# Patient Record
Sex: MALE | Race: WHITE | URBAN - METROPOLITAN AREA
[De-identification: names, ages, dates, MRNs, and addresses within clinical notes are randomized per-mention and may not be internally consistent; named-entity substitution may affect disease eponyms.]

---

## 2024-03-04 ENCOUNTER — OFFICE VISIT (OUTPATIENT)
Dept: ORTHOPEDIC SURGERY | Age: 44
End: 2024-03-04
Payer: COMMERCIAL

## 2024-03-04 VITALS — WEIGHT: 160 LBS | HEIGHT: 71 IN | BODY MASS INDEX: 22.4 KG/M2

## 2024-03-04 DIAGNOSIS — M71.21 BAKER'S CYST OF KNEE, RIGHT: ICD-10-CM

## 2024-03-04 DIAGNOSIS — M25.561 CHRONIC PAIN OF RIGHT KNEE: ICD-10-CM

## 2024-03-04 DIAGNOSIS — M84.453A INSUFFICIENCY FRACTURE OF MEDIAL FEMORAL CONDYLE (HCC): ICD-10-CM

## 2024-03-04 DIAGNOSIS — G89.29 CHRONIC PAIN OF RIGHT KNEE: ICD-10-CM

## 2024-03-04 DIAGNOSIS — M23.203 OLD BUCKET HANDLE TEAR OF MEDIAL MENISCUS OF RIGHT KNEE: Primary | ICD-10-CM

## 2024-03-04 PROCEDURE — 99203 OFFICE O/P NEW LOW 30 MIN: CPT | Performed by: ORTHOPAEDIC SURGERY

## 2024-03-04 RX ORDER — MELOXICAM 7.5 MG/1
7.5 TABLET ORAL DAILY
COMMUNITY
Start: 2024-02-12

## 2024-03-04 RX ORDER — TRAMADOL HYDROCHLORIDE 50 MG/1
50 TABLET ORAL EVERY 8 HOURS PRN
COMMUNITY
Start: 2024-02-12

## 2024-03-04 RX ORDER — LISINOPRIL 20 MG/1
20 TABLET ORAL DAILY
COMMUNITY

## 2024-03-04 RX ORDER — AMLODIPINE BESYLATE 10 MG/1
10 TABLET ORAL DAILY
COMMUNITY
Start: 2024-02-26

## 2024-03-04 RX ORDER — OMEPRAZOLE 20 MG/1
CAPSULE, DELAYED RELEASE ORAL
COMMUNITY
Start: 2024-02-05

## 2024-03-04 NOTE — PROGRESS NOTES
KNEE VISIT      HISTORY OF PRESENT ILLNESS    Dwight Vivar is a 43 y.o. male who presents for evaluation of his right knee.  He said pain in the right knee for greater than a year grades it 5/10.  He has stiffness and pulling type pain that sharp at time on the medial side.  He has some time another physician who recommended a knee replacement.  He was concerned with for not to have it because of his young age.  He has had no other intervention.  He did have an MRI performed and brought that with him.    ROS    Well-documented patient history form dated 3/4/2024  All other ROS negative except for above.    Past Surgical history    Past Surgical History:   Procedure Laterality Date    CHOLECYSTECTOMY      WRIST GANGLION EXCISION  3/2/2012    right       PAST MEDICAL    Past Medical History:   Diagnosis Date    Disc     enlarged- in middle back    Reflux        Allergies    No Known Allergies    Meds    Current Outpatient Medications   Medication Sig Dispense Refill    amLODIPine (NORVASC) 10 MG tablet Take 1 tablet by mouth daily high blood pressure      meloxicam (MOBIC) 7.5 MG tablet Take 1 tablet by mouth daily      omeprazole (PRILOSEC) 20 MG delayed release capsule TAKE ONE CAPSULE BY MOUTH ONCE DAILY AS NEEDED FOR ACID REFLUX; USE IF FAMOTIDINE IS NOT EFFECTIVE      traMADol (ULTRAM) 50 MG tablet Take 1 tablet by mouth every 8 hours as needed for Pain. Max Daily Amount: 150 mg      lisinopril (PRINIVIL;ZESTRIL) 20 MG tablet Take 1 tablet by mouth daily for blood pressure      esomeprazole (NEXIUM) 40 MG capsule Take 40 mg by mouth daily.        TraMADol HCl (ULTRAM PO) Take  by mouth 2 times daily.         No current facility-administered medications for this visit.       Social    Social History     Socioeconomic History    Marital status:      Spouse name: Not on file    Number of children: Not on file    Years of education: Not on file    Highest education level: Not on file   Occupational History

## 2025-07-30 ENCOUNTER — OFFICE VISIT (OUTPATIENT)
Dept: ORTHOPEDIC SURGERY | Age: 45
End: 2025-07-30
Payer: COMMERCIAL

## 2025-07-30 VITALS — WEIGHT: 160 LBS | HEIGHT: 71 IN | BODY MASS INDEX: 22.4 KG/M2

## 2025-07-30 DIAGNOSIS — G89.29 CHRONIC PAIN OF RIGHT KNEE: Primary | ICD-10-CM

## 2025-07-30 DIAGNOSIS — M25.561 CHRONIC PAIN OF RIGHT KNEE: Primary | ICD-10-CM

## 2025-07-30 DIAGNOSIS — M84.453A INSUFFICIENCY FRACTURE OF MEDIAL FEMORAL CONDYLE (HCC): ICD-10-CM

## 2025-07-30 PROCEDURE — 99214 OFFICE O/P EST MOD 30 MIN: CPT | Performed by: ORTHOPAEDIC SURGERY

## 2025-07-30 NOTE — PROGRESS NOTES
KNEE VISIT      HISTORY OF PRESENT ILLNESS    Dwight Vivar is a 45 y.o. male who presents for evaluation of right knee pain has had for some time.  He said he had surgery April of last year which was a medial meniscectomy and chondroplasty with subchondroplasty of the medial femoral condyle I believe.  He said it did not help.  He grades the pain now 7/10 is medial and he has popping and catching top of his knee with a knot behind the knee.  He had Durolane injection in November of this year which helped for a few months.  He denies any interval trauma.  He has difficulty squatting and it is interfering with the quality of his life    ROS    Well-documented patient history form dated 7/30/2025  All other ROS negative except for above.    Past Surgical history    Past Surgical History:   Procedure Laterality Date    CHOLECYSTECTOMY      WRIST GANGLION EXCISION  3/2/2012    right       PAST MEDICAL    Past Medical History:   Diagnosis Date    Disc     enlarged- in middle back    Reflux        Allergies    No Known Allergies    Meds    Current Outpatient Medications   Medication Sig Dispense Refill    amLODIPine (NORVASC) 10 MG tablet Take 1 tablet by mouth daily high blood pressure      lisinopril (PRINIVIL;ZESTRIL) 20 MG tablet Take 1 tablet by mouth daily for blood pressure      meloxicam (MOBIC) 7.5 MG tablet Take 1 tablet by mouth daily      omeprazole (PRILOSEC) 20 MG delayed release capsule TAKE ONE CAPSULE BY MOUTH ONCE DAILY AS NEEDED FOR ACID REFLUX; USE IF FAMOTIDINE IS NOT EFFECTIVE      traMADol (ULTRAM) 50 MG tablet Take 1 tablet by mouth every 8 hours as needed for Pain. Max Daily Amount: 150 mg      esomeprazole (NEXIUM) 40 MG capsule Take 40 mg by mouth daily.        TraMADol HCl (ULTRAM PO) Take  by mouth 2 times daily.         No current facility-administered medications for this visit.       Social    Social History     Socioeconomic History    Marital status:      Spouse name: Not on

## 2025-07-31 ENCOUNTER — TELEPHONE (OUTPATIENT)
Dept: ORTHOPEDIC SURGERY | Age: 45
End: 2025-07-31

## 2025-07-31 RX ORDER — CELECOXIB 200 MG/1
200 CAPSULE ORAL 2 TIMES DAILY
Qty: 180 CAPSULE | Refills: 2 | Status: SHIPPED | OUTPATIENT
Start: 2025-07-31

## 2025-07-31 NOTE — TELEPHONE ENCOUNTER
STATES HE CAME IN FROM A APPOINTMENT YESTERDAY AND THE DOCTOR WAS SUPPOSED TO PRESCRIBE NEW MEDICATION    PLEASE CALL PATIENT -[phone]     SEND ANY MEDICATION TO PHARMACY BELOW    [facility], [place] [address] [address] [place] - [place] [phone] - F [phone]

## 2025-08-01 ENCOUNTER — TELEPHONE (OUTPATIENT)
Dept: ORTHOPEDIC SURGERY | Age: 45
End: 2025-08-01

## 2025-08-01 NOTE — TELEPHONE ENCOUNTER
Prescription Refill     Medication Name: RESEND MEDICATION TO PHARMACY THEY NEVER GOT IT  Pharmacy: [facility] [address] [place] - [place] [phone] - F [phone]   Patient Contact Number:  [phone]

## 2025-08-01 NOTE — TELEPHONE ENCOUNTER
R/C TO PATIENT. LVM    RX WAS SENT TO [facility]. PATIENT CAN CALL BACK AND WE CAN TRY TO SEND TO LOCAL PHARMACY, IF NECESSARY. OR PATIENT CAN WAIT TO JUST RECEIVE RX FROM MAIL ORDER.